# Patient Record
Sex: FEMALE | Race: WHITE
[De-identification: names, ages, dates, MRNs, and addresses within clinical notes are randomized per-mention and may not be internally consistent; named-entity substitution may affect disease eponyms.]

---

## 2017-07-12 ENCOUNTER — HOSPITAL ENCOUNTER (OUTPATIENT)
Dept: HOSPITAL 62 - OD | Age: 37
End: 2017-07-12
Attending: FAMILY MEDICINE
Payer: MEDICAID

## 2017-07-12 DIAGNOSIS — M25.561: Primary | ICD-10-CM

## 2017-07-12 NOTE — RADIOLOGY REPORT (SQ)
EXAM DESCRIPTION:  KNEE RIGHT 4 VIEWS



COMPLETED DATE/TIME:  7/12/2017 4:32 pm



REASON FOR STUDY:  PAIN IN RIGHT KNEE M25.561  PAIN IN RIGHT KNEE



COMPARISON:  None.



NUMBER OF VIEWS:  Four views.



TECHNIQUE:  AP, lateral, and both oblique radiographic images acquired of the right knee.



LIMITATIONS:  None.



FINDINGS:  MINERALIZATION: Normal.

BONES: No acute fracture or dislocation. No worrisome bone lesions. No significant osteophytes.

JOINT: No effusion.  No chondrocalcinosis.

OTHER: No other significant finding.



IMPRESSION:  No significant findings.



TECHNICAL DOCUMENTATION:  JOB ID:  [id number]

 2011 [employer]- All Rights Reserved

## 2020-02-24 ENCOUNTER — HOSPITAL ENCOUNTER (EMERGENCY)
Dept: HOSPITAL 62 - ER | Age: 40
Discharge: HOME | End: 2020-02-24
Payer: COMMERCIAL

## 2020-02-24 VITALS — SYSTOLIC BLOOD PRESSURE: 119 MMHG | DIASTOLIC BLOOD PRESSURE: 63 MMHG

## 2020-02-24 DIAGNOSIS — F17.210: ICD-10-CM

## 2020-02-24 DIAGNOSIS — M79.672: ICD-10-CM

## 2020-02-24 DIAGNOSIS — M25.572: Primary | ICD-10-CM

## 2020-02-24 DIAGNOSIS — K02.9: ICD-10-CM

## 2020-02-24 DIAGNOSIS — K08.89: ICD-10-CM

## 2020-02-24 PROCEDURE — 99283 EMERGENCY DEPT VISIT LOW MDM: CPT

## 2020-02-24 NOTE — RADIOLOGY REPORT (SQ)
Left ankle radiographs: 2/24/2020 2:33 AM CST



HISTORY: 39-year-old patient with left ankle pain .



COMPARISON: None available



TECHNIQUE: AP, lateral, mortise views of the left ankle were

obtained.



FINDINGS: There are no findings to suggest an acute fracture or

subluxation within the left ankle. No abnormal soft tissue

swelling is seen. No abnormal calcification, periarticular

osteopenia, or gross erosions are seen. The joint spaces are

preserved. There is a incidental calcaneal heel spur present.



IMPRESSION: There are no findings to suggest an acute fracture or

subluxation within the left ankle.

## 2020-02-24 NOTE — ER DOCUMENT REPORT
HPI





- HPI


Patient complains to provider of: Left ankle pain


Time Seen by Provider: 20 08:02


Onset: Other - 2 weeks


Quality of pain: Achy, Throbbing


Severity: Moderate


Pain Level: 2


Context: 





39-year-old female presents emergency department with complaints of left foot 

and ankle pain for the past 2 weeks.  She denies trauma.  Denies falling or 

rolling her ankle.  Denies past medical history of injury to the foot or ankle. 

She reports she works at [employer] and is on her feet at least 9 hours a day 5 days 

a week.  She reports she has noticed pain to her left foot for the past 2 weeks.

 Reports she switched her shoes, has worn boots, placed pads in her shoes wit

hout relief of symptoms.  She reports the pain is worse when she first wakes up.

 She reports she has taken Tylenol Motrin without relief of symptoms.  Patient 

also reports she has had dental pain for the past 3 weeks.  Reports it Broke off

and she has had pain since that time.  Does not have a dental appointment until 

2 weeks from now.


Associated Symptoms: None


Exacerbated by: Walking


Relieved by: Denies


Similar symptoms previously: No


Recently seen / treated by doctor: No





- CONSTITUTIONAL


Constitutional: DENIES: Fever, Chills





- EENT


EENT: DENIES: Sore Throat, Ear Pain, Eye problems





- NEURO


Neurology: DENIES: Headache, Weakness, Vision blurred, Dizzinesss / Vertigo





- CARDIOVASCULAR


Cardiovascular: DENIES: Chest pain





- RESPIRATORY


Respiratory: DENIES: Trouble Breathing, Coughing





- GASTROINTESTINAL


Gastrointestinal: DENIES: Abdominal Pain, Black / Bloody Stools





- URINARY


Urinary: DENIES: Dysuria, Urgency, Frequency





- REPRODUCTIVE


Reproductive: DENIES: Pregnant:





- MUSCULOSKELETAL


Musculoskeletal: REPORTS: Extremity pain - left ankle





Past Medical History





- General


Information source: Patient





- Social History


Smoking Status: Current Every Day Smoker


Cigarette use (# per day): Yes


Chew tobacco use (# tins/day): No


Frequency of alcohol use: None


Drug Abuse: None


Occupation: [employer]


Lives with: Family


Family History: None


Patient has suicidal ideation: No


Patient has homicidal ideation: No





- Medical History


Medical History: Negative


Pulmonary Medical History: 


   Denies: Hx Tuberculosis


Musculoskeletal Medical History: Reports Hx Musculoskeletal Trauma


Psychiatric Medical History: Reports: Hx Anxiety


Traumatic Medical History: Reports: Hx Fractures, Hx Spine Fracture


Past Surgical History: Reports: Hx Appendectomy, Hx Tonsillectomy.  Denies: Hx 

 Section, Hx Hysterectomy, Hx Mastectomy, Hx Pacemaker, Hx Tubal 

Ligation





- Immunizations


Immunizations up to date: Yes


Hx Diphtheria, Pertussis, Tetanus Vaccination: Yes





Vertical Provider Document





- CONSTITUTIONAL


Agree With Documented VS: Yes


Exam Limitations: No Limitations


General Appearance: WD/WN, No Apparent Distress





- INFECTION CONTROL


TRAVEL OUTSIDE OF THE [place] IN LAST 30 DAYS: No





- HEENT


HEENT: Atraumatic, Normocephalic


Mouth Diagram: 


                            __________________________














                            __________________________





 1 - Complains of pain.  Dental cavity noted no erythema no swelling opens mouth

wide clear voice, no Wilma's no trismus








- NECK


Neck: Normal Inspection, Supple.  negative: Lymphadenopathy-Left, L

ymphadenopathy-Right





- RESPIRATORY


Respiratory: Breath Sounds Normal, No Respiratory Distress





- CARDIOVASCULAR


Cardiovascular: Regular Rate, Regular Rhythm





- MUSCULOSKELETAL/EXTREMETIES


Musculoskeletal/Extremeties: MAEW, FROM, Tender - Left foot/ankle medially 

tender to palpate no obvious deformity.  Good pedal pulse, cap refill less than 

2 seconds.  No erythema no swelling no warmth noted.  Denies anterior pain.





- NEURO


Level of Consciousness: Awake, Alert, Appropriate


Motor/Sensory: No Motor Deficit





- DERM


Integumentary: Warm, Dry


Adult Front & Back Diagram: 


                            __________________________














                            __________________________





 1 - Reports pain with touch pain with walking.








Course





- Re-evaluation


Re-evalutation: 





20 10:29


39-year-old female presents with left medial foot and ankle pain for the past 2 

weeks with no history of trauma.  Also complains of dental pain.  Patient was 

treated postop shoe instructed on Motrin and importance of follow-up with 

podiatry.  Also provided with a prescription for Pen-Vee K for dental pain.  She

has an appointment in 2 weeks with a dentist.  Possible plantar fasciitis even 

though she is not tender on the plantar area.  She was instructed on foot 

exercises ice Motrin.  She verbalized understanding to all instruction


20 10:30


                                        





Ankle X-Ray  20 03:03


IMPRESSION: There are no findings to suggest an acute fracture or


subluxation within the left ankle.


 














- Vital Signs


Vital signs: 


                                        











Temp Pulse Resp BP Pulse Ox


 


 97.7 F   85   20   128/79 H  98 


 


 20 02:50  20 02:50  20 02:50  20 02:50  20 02:50














- Diagnostic Test


Radiology reviewed: Image reviewed, Reports reviewed





Procedures





- Immobilization


  ** Left Foot


Immobilizer type: Post-op shoe


Performed by: RN


Post-Proc Neuro Vasc Exam: Unchanged from pre-exam





Discharge





- Discharge


Clinical Impression: 


 left ankle/foot pain, Pain, dental





Condition: Stable


Disposition: HOME, SELF-CARE


Instructions:  Exercises for the Foot Muscles ([facility]), Ice & Elevation ([facility]), 

Penicillin V K ([facility]), Post-Op Shoe ([facility]), Toothache ([facility])


Additional Instructions: 


*You have been evaluated for left foot ankle pain, dental pain


*Maintain the postop shoe for comfort


*Foot exercises as discussed, wear good supporting shoes


*Rest/Ice/Elevate


*Follow up with the dentist as scheduled


*Follow-up with podiatrist within the next week


*Take medication as prescribed, take Tylenol or ibuprofen as indicated for pain


*Return to ED for worsening condition, changes, needs











Monitor your blood pressure. Your blood pressure was elevated today.  This may 

be because you were anxious, in pain or because you need medication.  It is im

portant to follow up with your primary care provider for full evaluation.





Prescriptions: 


Ibuprofen [Motrin 800 mg Tablet] 800 mg PO TID #15 tablet


Penicillin V Potassium [Penicillin Vk 500 mg Tablet] 500 mg PO BID #20 tablet


Forms:  Elevated Blood Pressure, Smoking Cessation Education, Return to Work


Referrals: 


YAZ RODRIGUEZ DPM [ACTIVE STAFF] - Follow up as needed


REINALDO SMITH DPM [ACTIVE STAFF] - Follow up as needed


GARCIA ALVAREZ DPM [ACTIVE STAFF] - Follow up as needed